# Patient Record
Sex: FEMALE | Race: BLACK OR AFRICAN AMERICAN | NOT HISPANIC OR LATINO | URBAN - METROPOLITAN AREA
[De-identification: names, ages, dates, MRNs, and addresses within clinical notes are randomized per-mention and may not be internally consistent; named-entity substitution may affect disease eponyms.]

---

## 2022-10-14 ENCOUNTER — NEW PATIENT (OUTPATIENT)
Dept: URBAN - METROPOLITAN AREA CLINIC 113 | Facility: CLINIC | Age: 72
End: 2022-10-14

## 2022-10-14 DIAGNOSIS — H43.812: ICD-10-CM

## 2022-10-14 DIAGNOSIS — H53.002: ICD-10-CM

## 2022-10-14 DIAGNOSIS — H25.12: ICD-10-CM

## 2022-10-14 DIAGNOSIS — Z96.1: ICD-10-CM

## 2022-10-14 PROCEDURE — 92202 OPSCPY EXTND ON/MAC DRAW: CPT | Mod: NC

## 2022-10-14 PROCEDURE — 92134 CPTRZ OPH DX IMG PST SGM RTA: CPT

## 2022-10-14 PROCEDURE — 92004 COMPRE OPH EXAM NEW PT 1/>: CPT

## 2022-10-14 ASSESSMENT — VISUAL ACUITY
OS_PH: 20/100+2
OS_SC: 20/150
OD_SC: 20/150+1

## 2022-10-14 ASSESSMENT — TONOMETRY
OS_IOP_MMHG: 19
OD_IOP_MMHG: 12

## 2024-07-11 ENCOUNTER — OFFICE VISIT (OUTPATIENT)
Dept: FAMILY MEDICINE | Facility: CLINIC | Age: 74
End: 2024-07-11
Payer: MEDICARE

## 2024-07-11 ENCOUNTER — NURSE TRIAGE (OUTPATIENT)
Dept: FAMILY MEDICINE | Facility: CLINIC | Age: 74
End: 2024-07-11
Payer: MEDICARE

## 2024-07-11 ENCOUNTER — HOSPITAL ENCOUNTER (EMERGENCY)
Facility: HOSPITAL | Age: 74
Discharge: HOME | End: 2024-07-11
Attending: EMERGENCY MEDICINE | Admitting: EMERGENCY MEDICINE
Payer: MEDICARE

## 2024-07-11 ENCOUNTER — APPOINTMENT (OUTPATIENT)
Dept: CT IMAGING | Facility: HOSPITAL | Age: 74
End: 2024-07-11
Attending: STUDENT IN AN ORGANIZED HEALTH CARE EDUCATION/TRAINING PROGRAM
Payer: MEDICARE

## 2024-07-11 VITALS
HEART RATE: 68 BPM | DIASTOLIC BLOOD PRESSURE: 79 MMHG | TEMPERATURE: 98 F | SYSTOLIC BLOOD PRESSURE: 151 MMHG | RESPIRATION RATE: 18 BRPM | OXYGEN SATURATION: 98 %

## 2024-07-11 VITALS
OXYGEN SATURATION: 100 % | BODY MASS INDEX: 30.37 KG/M2 | TEMPERATURE: 98 F | WEIGHT: 189 LBS | DIASTOLIC BLOOD PRESSURE: 81 MMHG | RESPIRATION RATE: 20 BRPM | SYSTOLIC BLOOD PRESSURE: 192 MMHG | HEART RATE: 67 BPM | HEIGHT: 66 IN

## 2024-07-11 DIAGNOSIS — R42 DIZZINESS: ICD-10-CM

## 2024-07-11 DIAGNOSIS — R07.9 ACUTE CHEST PAIN: Primary | ICD-10-CM

## 2024-07-11 DIAGNOSIS — R07.9 CHEST PAIN, UNSPECIFIED TYPE: ICD-10-CM

## 2024-07-11 DIAGNOSIS — Z75.8 DOES NOT HAVE PRIMARY CARE PROVIDER: ICD-10-CM

## 2024-07-11 PROBLEM — E78.5 HYPERLIPIDEMIA: Chronic | Status: ACTIVE | Noted: 2017-03-28

## 2024-07-11 PROBLEM — E55.9 VITAMIN D DEFICIENCY: Status: ACTIVE | Noted: 2017-03-28

## 2024-07-11 LAB
ALBUMIN SERPL BCG-MCNC: 4.4 G/DL (ref 3.5–5.2)
ALBUMIN UR-MCNC: NEGATIVE MG/DL
ALP SERPL-CCNC: 99 U/L (ref 40–150)
ALT SERPL W P-5'-P-CCNC: 12 U/L (ref 0–50)
ANION GAP SERPL CALCULATED.3IONS-SCNC: 12 MMOL/L (ref 7–15)
APPEARANCE UR: CLEAR
AST SERPL W P-5'-P-CCNC: 17 U/L (ref 0–45)
ATRIAL RATE - MUSE: 66 BPM
BACTERIA #/AREA URNS HPF: ABNORMAL /HPF
BASOPHILS # BLD AUTO: 0 10E3/UL (ref 0–0.2)
BASOPHILS NFR BLD AUTO: 0 %
BILIRUB SERPL-MCNC: 0.2 MG/DL
BILIRUB UR QL STRIP: NEGATIVE
BUN SERPL-MCNC: 13.1 MG/DL (ref 8–23)
CALCIUM SERPL-MCNC: 9.6 MG/DL (ref 8.8–10.2)
CHLORIDE SERPL-SCNC: 106 MMOL/L (ref 98–107)
COLOR UR AUTO: COLORLESS
CREAT SERPL-MCNC: 0.92 MG/DL (ref 0.51–0.95)
D DIMER PPP FEU-MCNC: 1.2 UG/ML FEU (ref 0–0.5)
DEPRECATED HCO3 PLAS-SCNC: 24 MMOL/L (ref 22–29)
DIASTOLIC BLOOD PRESSURE - MUSE: NORMAL MMHG
EGFRCR SERPLBLD CKD-EPI 2021: 66 ML/MIN/1.73M2
EOSINOPHIL # BLD AUTO: 0.1 10E3/UL (ref 0–0.7)
EOSINOPHIL NFR BLD AUTO: 1 %
ERYTHROCYTE [DISTWIDTH] IN BLOOD BY AUTOMATED COUNT: 12.8 % (ref 10–15)
FLUAV RNA SPEC QL NAA+PROBE: NEGATIVE
FLUBV RNA RESP QL NAA+PROBE: NEGATIVE
GLUCOSE SERPL-MCNC: 94 MG/DL (ref 70–99)
GLUCOSE UR STRIP-MCNC: NEGATIVE MG/DL
HCT VFR BLD AUTO: 37.4 % (ref 35–47)
HGB BLD-MCNC: 11.6 G/DL (ref 11.7–15.7)
HGB UR QL STRIP: NEGATIVE
IMM GRANULOCYTES # BLD: 0 10E3/UL
IMM GRANULOCYTES NFR BLD: 0 %
INTERPRETATION ECG - MUSE: NORMAL
KETONES UR STRIP-MCNC: NEGATIVE MG/DL
LEUKOCYTE ESTERASE UR QL STRIP: ABNORMAL
LYMPHOCYTES # BLD AUTO: 1.9 10E3/UL (ref 0.8–5.3)
LYMPHOCYTES NFR BLD AUTO: 43 %
MAGNESIUM SERPL-MCNC: 2.1 MG/DL (ref 1.7–2.3)
MCH RBC QN AUTO: 28.4 PG (ref 26.5–33)
MCHC RBC AUTO-ENTMCNC: 31 G/DL (ref 31.5–36.5)
MCV RBC AUTO: 92 FL (ref 78–100)
MONOCYTES # BLD AUTO: 0.4 10E3/UL (ref 0–1.3)
MONOCYTES NFR BLD AUTO: 8 %
NEUTROPHILS # BLD AUTO: 2.1 10E3/UL (ref 1.6–8.3)
NEUTROPHILS NFR BLD AUTO: 47 %
NITRATE UR QL: NEGATIVE
NRBC # BLD AUTO: 0 10E3/UL
NRBC BLD AUTO-RTO: 0 /100
NT-PROBNP SERPL-MCNC: 74 PG/ML (ref 0–900)
P AXIS - MUSE: 70 DEGREES
PH UR STRIP: 5.5 [PH] (ref 5–7)
PLATELET # BLD AUTO: 223 10E3/UL (ref 150–450)
POTASSIUM SERPL-SCNC: 4.5 MMOL/L (ref 3.4–5.3)
PR INTERVAL - MUSE: 174 MS
PROT SERPL-MCNC: 7.4 G/DL (ref 6.4–8.3)
QRS DURATION - MUSE: 92 MS
QT - MUSE: 402 MS
QTC - MUSE: 421 MS
R AXIS - MUSE: -3 DEGREES
RBC # BLD AUTO: 4.08 10E6/UL (ref 3.8–5.2)
RBC URINE: 1 /HPF
RSV RNA SPEC NAA+PROBE: NEGATIVE
SARS-COV-2 RNA RESP QL NAA+PROBE: NEGATIVE
SODIUM SERPL-SCNC: 142 MMOL/L (ref 135–145)
SP GR UR STRIP: 1.01 (ref 1–1.03)
SQUAMOUS EPITHELIAL: <1 /HPF
SYSTOLIC BLOOD PRESSURE - MUSE: NORMAL MMHG
T AXIS - MUSE: 29 DEGREES
TROPONIN T SERPL HS-MCNC: 6 NG/L
TSH SERPL DL<=0.005 MIU/L-ACNC: 1.04 UIU/ML (ref 0.3–4.2)
UROBILINOGEN UR STRIP-MCNC: <2 MG/DL
VENTRICULAR RATE- MUSE: 66 BPM
WBC # BLD AUTO: 4.5 10E3/UL (ref 4–11)
WBC URINE: 1 /HPF

## 2024-07-11 PROCEDURE — 81001 URINALYSIS AUTO W/SCOPE: CPT | Performed by: STUDENT IN AN ORGANIZED HEALTH CARE EDUCATION/TRAINING PROGRAM

## 2024-07-11 PROCEDURE — 80053 COMPREHEN METABOLIC PANEL: CPT | Performed by: STUDENT IN AN ORGANIZED HEALTH CARE EDUCATION/TRAINING PROGRAM

## 2024-07-11 PROCEDURE — 85004 AUTOMATED DIFF WBC COUNT: CPT | Performed by: STUDENT IN AN ORGANIZED HEALTH CARE EDUCATION/TRAINING PROGRAM

## 2024-07-11 PROCEDURE — 99285 EMERGENCY DEPT VISIT HI MDM: CPT | Mod: 25

## 2024-07-11 PROCEDURE — 85379 FIBRIN DEGRADATION QUANT: CPT | Performed by: STUDENT IN AN ORGANIZED HEALTH CARE EDUCATION/TRAINING PROGRAM

## 2024-07-11 PROCEDURE — 84443 ASSAY THYROID STIM HORMONE: CPT | Performed by: STUDENT IN AN ORGANIZED HEALTH CARE EDUCATION/TRAINING PROGRAM

## 2024-07-11 PROCEDURE — 87637 SARSCOV2&INF A&B&RSV AMP PRB: CPT | Performed by: STUDENT IN AN ORGANIZED HEALTH CARE EDUCATION/TRAINING PROGRAM

## 2024-07-11 PROCEDURE — 36415 COLL VENOUS BLD VENIPUNCTURE: CPT | Performed by: STUDENT IN AN ORGANIZED HEALTH CARE EDUCATION/TRAINING PROGRAM

## 2024-07-11 PROCEDURE — 83735 ASSAY OF MAGNESIUM: CPT | Performed by: STUDENT IN AN ORGANIZED HEALTH CARE EDUCATION/TRAINING PROGRAM

## 2024-07-11 PROCEDURE — 93000 ELECTROCARDIOGRAM COMPLETE: CPT | Mod: TC | Performed by: NURSE PRACTITIONER

## 2024-07-11 PROCEDURE — 71275 CT ANGIOGRAPHY CHEST: CPT | Mod: ME

## 2024-07-11 PROCEDURE — 96360 HYDRATION IV INFUSION INIT: CPT | Mod: 59

## 2024-07-11 PROCEDURE — 93005 ELECTROCARDIOGRAM TRACING: CPT | Performed by: STUDENT IN AN ORGANIZED HEALTH CARE EDUCATION/TRAINING PROGRAM

## 2024-07-11 PROCEDURE — 96361 HYDRATE IV INFUSION ADD-ON: CPT

## 2024-07-11 PROCEDURE — 250N000011 HC RX IP 250 OP 636: Performed by: EMERGENCY MEDICINE

## 2024-07-11 PROCEDURE — 93005 ELECTROCARDIOGRAM TRACING: CPT | Performed by: EMERGENCY MEDICINE

## 2024-07-11 PROCEDURE — 83880 ASSAY OF NATRIURETIC PEPTIDE: CPT | Performed by: STUDENT IN AN ORGANIZED HEALTH CARE EDUCATION/TRAINING PROGRAM

## 2024-07-11 PROCEDURE — 99204 OFFICE O/P NEW MOD 45 MIN: CPT | Performed by: NURSE PRACTITIONER

## 2024-07-11 PROCEDURE — 258N000003 HC RX IP 258 OP 636: Performed by: STUDENT IN AN ORGANIZED HEALTH CARE EDUCATION/TRAINING PROGRAM

## 2024-07-11 PROCEDURE — 93010 ELECTROCARDIOGRAM REPORT: CPT | Mod: OFF | Performed by: INTERNAL MEDICINE

## 2024-07-11 PROCEDURE — 84484 ASSAY OF TROPONIN QUANT: CPT | Performed by: STUDENT IN AN ORGANIZED HEALTH CARE EDUCATION/TRAINING PROGRAM

## 2024-07-11 RX ORDER — CETIRIZINE HYDROCHLORIDE 10 MG/1
1 TABLET ORAL
COMMUNITY
Start: 2023-09-19

## 2024-07-11 RX ORDER — SIMVASTATIN 20 MG
20 TABLET ORAL EVERY MORNING
COMMUNITY
Start: 2024-06-03

## 2024-07-11 RX ORDER — ALBUTEROL SULFATE 90 UG/1
AEROSOL, METERED RESPIRATORY (INHALATION)
COMMUNITY
Start: 2024-06-01

## 2024-07-11 RX ORDER — ASPIRIN 325 MG
325 TABLET ORAL ONCE
Status: COMPLETED | OUTPATIENT
Start: 2024-07-11 | End: 2024-07-11

## 2024-07-11 RX ORDER — IOPAMIDOL 755 MG/ML
90 INJECTION, SOLUTION INTRAVASCULAR ONCE
Status: COMPLETED | OUTPATIENT
Start: 2024-07-11 | End: 2024-07-11

## 2024-07-11 RX ORDER — ALBUTEROL SULFATE 0.83 MG/ML
SOLUTION RESPIRATORY (INHALATION)
COMMUNITY
Start: 2024-04-16

## 2024-07-11 RX ORDER — PREDNISONE 20 MG/1
1 TABLET ORAL
COMMUNITY
Start: 2024-05-31

## 2024-07-11 RX ADMIN — IOPAMIDOL 90 ML: 755 INJECTION, SOLUTION INTRAVENOUS at 18:20

## 2024-07-11 RX ADMIN — SODIUM CHLORIDE 1000 ML: 9 INJECTION, SOLUTION INTRAVENOUS at 16:35

## 2024-07-11 RX ADMIN — Medication 325 MG: at 15:20

## 2024-07-11 ASSESSMENT — ENCOUNTER SYMPTOMS
COUGH: 0
DIZZINESS: 1
FEVER: 0

## 2024-07-11 ASSESSMENT — COLUMBIA-SUICIDE SEVERITY RATING SCALE - C-SSRS
2. HAVE YOU ACTUALLY HAD ANY THOUGHTS OF KILLING YOURSELF IN THE PAST MONTH?: NO
1. IN THE PAST MONTH, HAVE YOU WISHED YOU WERE DEAD OR WISHED YOU COULD GO TO SLEEP AND NOT WAKE UP?: NO
6. HAVE YOU EVER DONE ANYTHING, STARTED TO DO ANYTHING, OR PREPARED TO DO ANYTHING TO END YOUR LIFE?: NO

## 2024-07-11 ASSESSMENT — ACTIVITIES OF DAILY LIVING (ADL)
ADLS_ACUITY_SCORE: 35
ADLS_ACUITY_SCORE: 35

## 2024-07-11 NOTE — PROGRESS NOTES
Assessment & Plan     Acute chest pain    - EKG 12-lead complete w/read - Clinics  - aspirin (ASA) tablet 325 mg  - EKG 12-lead, tracing only     Patient with a history of hypertension and hyperlipidemia with achy chest pain starting yesterday.  Midsternal.  Currently rated 5 out of 10.  Associated little bit of dizziness.    EKG done here showed normal sinus without concerning changes.  Did give 325 mg aspirin.    Explained need for emergency room evaluation.  Here with daughter.  Daughter will take her to [facility] ED.           No follow-ups on file.    Cyndi Doty, [facility] FLORENTINO Gonzales is a 73 year old female who presents to clinic today for the following health issues:  Chief Complaint   Patient presents with    Chest Pain     Chest pain/headache/dizzy/weakness since yesterday          HPI    Patient with history of asthma, hypertension, hyperlipidemia with midsternal chest pain starting yesterday.  Says it is better than it was earlier.  Rates current pain 5 out of 10.  Feels achy.  Does not feel wheezy or like her asthma is acting up at this time.    Associate with a little bit of dizziness.  Does not think it is much worse with walking.        Review of Systems   Constitutional:  Negative for fever.   Respiratory:  Negative for cough.    Neurological:  Positive for dizziness (A little bit).           Objective    BP (!) 151/79   Pulse 68   Temp 98  F (36.7  C) (Tympanic)   Resp 18   SpO2 98%   Physical Exam  Constitutional:       Appearance: Normal appearance.   HENT:      Mouth/Throat:      Mouth: Mucous membranes are moist.   Pulmonary:      Effort: Pulmonary effort is normal.   Musculoskeletal:         General: Normal range of motion.   Skin:     General: Skin is warm.   Neurological:      Mental Status: She is alert and oriented to person, place, and time.   Psychiatric:         Mood and Affect: Mood normal.                Results for orders placed or  performed in visit on 07/11/24   EKG 12-lead, tracing only     Status: None (Preliminary result)   Result Value Ref Range    Systolic Blood Pressure  mmHg    Diastolic Blood Pressure  mmHg    Ventricular Rate 66 BPM    Atrial Rate 66 BPM    ME Interval 174 ms    QRS Duration 92 ms     ms    QTc 421 ms    P Axis 70 degrees    R AXIS -3 degrees    T Axis 29 degrees    Interpretation ECG       Sinus rhythm  Normal ECG  No previous ECGs available

## 2024-07-11 NOTE — ED TRIAGE NOTES
Pt reports CP and fatigue and fevers since yesterday, pt was seen at  and was told to come to the ED.  Pt reports feeling feverish, but is afebrile in triage, pt has taken APAP PTA.     Triage Assessment (Adult)       Row Name 07/11/24 1540          Triage Assessment    Airway WDL WDL        Respiratory WDL    Respiratory WDL WDL        Skin Circulation/Temperature WDL    Skin Circulation/Temperature WDL WDL        Cardiac WDL    Cardiac WDL X;chest pain        Chest Pain Assessment    Chest Pain Location midsternal     Chest Pain Radiation arm     Character pressure     Alleviating Factors medications        Peripheral/Neurovascular WDL    Peripheral Neurovascular WDL WDL        Cognitive/Neuro/Behavioral WDL    Cognitive/Neuro/Behavioral WDL WDL

## 2024-07-11 NOTE — ED PROVIDER NOTES
Emergency Department Encounter   NAME: Janet Brar ; AGE: 73 year old female ; YOB: 1950 ; MRN: [id number] ; PCP: No Ref-Primary, Physician   ED PROVIDER: Irma Horta PA-C    Evaluation Date & Time:   No admission date for patient encounter.    CHIEF COMPLAINT:  Chest Pain, Fever, and Fatigue (/)        Impression and Plan   FINAL IMPRESSION:    ICD-10-CM    1. Chest pain, unspecified type  R07.9 Primary Care Referral     [facility]  Referral      2. Dizziness  R42 Primary Care Referral     [facility]  Referral      3. Does not have primary care provider  Z75.8 Primary Care Referral          MDM:  Patient is a 73 year old female presenting to the ED for evaluation of chest pain, dizziness, and fatigue that started last night around 7:30 PM.  She states she was sitting in a chair listening to audio on her phone when she developed sudden tightness in the center of her chest and then experienced room spinning dizziness. States the chest pain lasted about 45 minutes and then subsided completely.  She denies any associated headache, vision changes, nausea, abdominal pain, shortness of breath, slurred speech, or confusion.  She endorses having 2 episodes similar to this last year but she went to bed and when she woke up in the morning her symptoms had subsided entirely.  Today she woke up and still had some dizziness so she went to urgent care. They performed EKG, gave her 325 ASA, and sent her to the ED for further evaluation. She rates her dizziness last night at an 8/10, today down to a 5/10. She still denies any vision changes, headache, slurred speech, weakness, or gait ataxia.  She states that she does not drink very much water does feel as though she is quite symptomatic with dehydration.  Also endorses occasional feelings of fever and chills.  Denies any known sick contacts and does not endorse cough, congestion, or sore throat.  Denies any falls or trauma  to the head.  She is not on any blood thinners.    Vitals reviewed and unremarkable.  She is afebrile temp 98 F.  On exam she is resting comfortably, nontoxic-appearing. Differential diagnosis includes but not limited to dehydration, anemia, electrolyte abnormality, viral infection, GERD, gastritis, PE, pneumonia, ACS, stroke, intracranial hemorrhage. Her COVID and influenza swabs are negative today.  CBC is negative for any evidence of leukocytosis or significant anemia.  Electrolytes within normal range.  EKG shows sinus rhythm with no evidence of prolonged QT, arrhythmia, or ischemia.  Her high-sensitivity troponin is 6 and not significant for ACS. This was not repeated given her symptoms started last night. Her neurologic exam today is without deficit.  Walking gait is nonantalgic and she does not have any vision changes.  I do not suspect stroke or other malicious pathology that would require imaging of the brain. I am unable to use PERC criteria to rule out pulmonary embolism given patient's age and she was describing pleuritic chest pain last night so D-dimer was ordered. This is slightly elevated at 1.2 so proceeded with CT PE.  This was negative for any evidence of PE, pneumonia, or other acute finding. There is evidence of coronary artery disease.    Patient endorses complete resolution of her dizziness after administration of 1 L of IV fluids.  She states she has been getting up to go to the bathroom throughout her time in the ED and has not been dizzy at home with ambulation. She does not have any chest pain currently and her cardiac workup is overall unremarkable, I think at this point she is safe for discharge home with plan for her to follow-up with PCP and cardiology for possible further testing. She recently relocated to the area and does not have a primary care established so I have placed a referral to primary care for her.  I have also placed a cardiology referral for her.  I recommended patient  try to stay hydrated and drink plenty of water and get plenty of rest. Patient and her sister were educated on concerning symptoms of warrant return to the emergency department and are understanding and agreeable to this plan.        History:  Supplemental history from: N/A  External Record(s) reviewed: Documented in chart and Outpatient Record: [facility]    Work Up:  Chart documentation includes differential considered and any EKGs or imaging independently interpreted by provider, where specified.  In additional to work up documented, I considered the following work up: Documented in chart, if applicable.    External consultation:  Discussion of management with another provider: Documented in chart, if applicable    Complicating factors:  Care impacted by chronic illness: N/A  Care affected by social determinants of health: N/A    Disposition considerations: Discharge. No recommendations on prescription strength medication(s). See documentation for any additional details.      ED COURSE:  4:04 PM I met and introduced myself to the patient. I gathered initial history and performed my physical exam. We discussed plan for initial workup.   4:38 PM I have staffed the patient with Dr. Munir Mcghee, ED MD, who has evaluated the patient and agrees with all aspects of today's care.   7:15 PM I rechecked the patient and discussed results, discharge, follow up, and reasons to return to the ED.     At the conclusion of the encounter I discussed the results of all the tests and the disposition. The questions were answered. The patient or family acknowledged understanding and was agreeable with the care plan.          MEDICATIONS GIVEN IN THE EMERGENCY DEPARTMENT:  Medications   sodium chloride 0.9% BOLUS 1,000 mL (0 mLs Intravenous Stopped 7/11/24 1901)   iopamidol (ISOVUE-370) solution 90 mL (90 mLs Intravenous $Given 7/11/24 1820)         NEW PRESCRIPTIONS STARTED AT TODAY'S ED VISIT:  New  Prescriptions    No medications on file         HPI   Patient information was obtained from: patient    Use of Intrepreter: N/A     Janet Brar is a 73 year old female with a pertinent history of, hypertension, hyperlipidemia who presents to the ED by private vehicle for evaluation of chest pain, dizziness and weakness.     Per chart review patient was seen at [facility] on 7/11/24 for chest pain, dizziness and weakness since 7/10/24. EKG was unremarkable. Given 325mg of Asprin and recommended to visit ED for her chest pain.     Per patient she starting yesterday (7/10/24) at 7:30 PM she had chest pain that felt like a weight on her chest, dizziness (8/10), weakness, nausea, and a headache onset. She proceeded to lie down for two hours then went to bed with no problems. This morning (7/11/24) patient woke up still feeling dizzy (5/10) and weak, but her chest pain, nausea, and headache subsided completely. She took some tylenol then went to urgent care, where they took her temperature and gave her Asprin. Upon evaluation patient still feels dizzy and the chest pain is not present. Patient endorses she has episodes like this twice in the last year without any evaluation because she would wake up feeling better in the morning. Note she hasn't taken her inhaler in the last two days and has not been drinking very much water.     Patient denies cough, congestion, shortness of breath, and pain while breathing.  Patient denies history of heart problems, smoking and blood clots.         Medical History     History reviewed. No pertinent past medical history.    History reviewed. No pertinent surgical history.    No family history on file.         albuterol (PROAIR HFA/PROVENTIL HFA/VENTOLIN HFA) 108 (90 Base) MCG/ACT inhaler  albuterol (PROVENTIL) (2.5 MG/3ML) 0.083% neb solution  cetirizine (ZYRTEC) 10 MG tablet  predniSONE (DELTASONE) 20 MG tablet  simvastatin (ZOCOR) 20 MG  "tablet          Physical Exam     First Vitals:  Patient Vitals for the past 24 hrs:   BP Temp Temp src Pulse Resp SpO2 Height Weight   07/11/24 1900 (!) 192/81 -- -- -- -- -- -- --   07/11/24 1800 (!) 195/82 -- -- 67 -- 100 % -- --   07/11/24 1754 -- -- -- -- -- -- 1.676 m (5' 6\") 85.7 kg (189 lb)   07/11/24 1607 105/68 98  F (36.7  C) Temporal 67 20 96 % -- --         PHYSICAL EXAM    General Appearance:  Alert, cooperative, no distress, appears stated age. Resting comfortably, non-toxic appearing.  HENT: Normocephalic without obvious deformity, atraumatic. Mucous membranes moist   Eyes: Conjunctiva clear, Lids normal. No discharge.   Respiratory: No distress. Lungs clear to ausculation bilaterally. No wheezes, rhonchi or stridor  Cardiovascular: Regular rate and rhythm, no murmur. Normal cap refill. No peripheral edema  GI: Abdomen soft, nontender  Musculoskeletal: Moving all extremities. No gross deformities  Integument: Warm, dry, no rashes or lesions  Neurologic: Alert and orientated x3. No focal deficits. GCS 15. Cranial nerves III through XII intact.  No facial asymmetry.  Sensation to light touch intact to face and all extremities.  Finger/nose/finger intact.  Negative pronator drift.  Intact straight leg raise.  5 out of 5 strength with , flexion extension at elbow joints, flexion at shoulder and hip joint against resistance.  Dorsiflexion and plantarflexion are intact.  Answering questions appropriately with normal speech.  No aphasia or dysarthria.  Following commands.  Intact visual fields.    Psych: Normal mood and affect        Results     LAB:  All pertinent labs reviewed and interpreted  Labs Ordered and Resulted from Time of ED Arrival to Time of ED Departure   ROUTINE UA WITH MICROSCOPIC REFLEX TO CULTURE - Abnormal       Result Value    Color Urine Colorless      Appearance Urine Clear      Glucose Urine Negative      Bilirubin Urine Negative      Ketones Urine Negative      Specific Gravity " Urine 1.006      Blood Urine Negative      pH Urine 5.5      Protein Albumin Urine Negative      Urobilinogen Urine <2.0      Nitrite Urine Negative      Leukocyte Esterase Urine 75 Eleni/uL (*)     Bacteria Urine Few (*)     RBC Urine 1      WBC Urine 1      Squamous Epithelials Urine <1     D DIMER QUANTITATIVE - Abnormal    D-Dimer Quantitative 1.20 (*)    CBC WITH PLATELETS AND DIFFERENTIAL - Abnormal    WBC Count 4.5      RBC Count 4.08      Hemoglobin 11.6 (*)     Hematocrit 37.4      MCV 92      MCH 28.4      MCHC 31.0 (*)     RDW 12.8      Platelet Count 223      % Neutrophils 47      % Lymphocytes 43      % Monocytes 8      % Eosinophils 1      % Basophils 0      % Immature Granulocytes 0      NRBCs per 100 WBC 0      Absolute Neutrophils 2.1      Absolute Lymphocytes 1.9      Absolute Monocytes 0.4      Absolute Eosinophils 0.1      Absolute Basophils 0.0      Absolute Immature Granulocytes 0.0      Absolute NRBCs 0.0     INFLUENZA A/B, RSV, & SARS-COV2 PCR - Normal    Influenza A PCR Negative      Influenza B PCR Negative      RSV PCR Negative      SARS CoV2 PCR Negative     TROPONIN T, HIGH SENSITIVITY - Normal    Troponin T, High Sensitivity 6     NT PROBNP INPATIENT - Normal    N terminal Pro BNP Inpatient 74     MAGNESIUM - Normal    Magnesium 2.1     TSH WITH FREE T4 REFLEX - Normal    TSH 1.04     COMPREHENSIVE METABOLIC PANEL - Normal    Sodium 142      Potassium 4.5      Carbon Dioxide (CO2) 24      Anion Gap 12      Urea Nitrogen 13.1      Creatinine 0.92      GFR Estimate 66      Calcium 9.6      Chloride 106      Glucose 94      Alkaline Phosphatase 99      AST 17      ALT 12      Protein Total 7.4      Albumin 4.4      Bilirubin Total 0.2         RADIOLOGY:  CT Chest Pulmonary Embolism w Contrast   Final Result   IMPRESSION:   1.  No PE.   2.  Coronary artery disease.   3.  A circumscribed low-attenuation right hepatic lesion is incompletely assessed. In the absence of hepatic parenchymal  disease a benign lesion is most likely. This could be assessed with MRI or CT. Ultrasound may be helpful.               ECG:  Performed at: 16:11    Impression: Sinus rhythm    Rate: 66  Rhythm: sinus  Axis: normal  DE Interval: 174  QRS Interval: 92  QTc Interval: 421  ST Changes: none  Comparison: no previous available    EKG results reviewed and interpreted by Dr. Litzy ED MD.       PROCEDURES:  N/A      I, Angus Nash , am serving as a scribe to document services personally performed by Irma Horta PA-C, based on my observation and the provider's statements to me. I, Irma Horta PA-C attest that Angus Nash  is acting in a scribe capacity, has observed my performance of the services and has documented them in accordance with my direction.       Irma Horta PA-C   Emergency Medicine   [facility] EMERGENCY DEPARTMENT       Irma Horta PA-C  07/11/24 1941       Irma Horta PA-C  07/11/24 1944

## 2024-07-11 NOTE — TELEPHONE ENCOUNTER
"Nurse Triage SBAR    Is this a 2nd Level Triage? NO    Situation: Dizziness     Background: Patient had chest pain last night which resolved and now has dizziness that won't go away    Assessment: Patient called in with complaints of dizziness rated from mild to moderate. She does not endorse chest pain that she had last night, but this dizziness hasn't gone away. She reports having it twice last year which resolved with simply sleeping, but that has not been the case for her this time. She reports having chills and being somewhat dehydrated as water \"isn't her thing\". She reports taking BP medications, though she does not check them at home at all. She was instructed to be seen due to the ambiguity of the patient's report as at one moment she would say she only has asthma, but as the conversation proceeded she then talked about other issues such as HTN, generalized weakness, etc. She is being taken in to be evaluated at [place].     Protocol Recommended Disposition:   No disposition on file.    Recommendation: Be seen in office today, no appointments available so sent to urgent care      Does the patient meet one of the following criteria for ADS visit consideration? 16+ years old, with an [facility] PCP     TIP  Providers, please consider if this condition is appropriate for management at one of our Acute and Diagnostic Services sites.     If patient is a good candidate, please use dotphrase <dot>triageresponse and select Refer to ADS to document.  Reason for Disposition   MODERATE dizziness (e.g., interferes with normal activities)  (Exception: Dizziness caused by heat exposure, sudden standing, or poor fluid intake.)    Additional Information   Negative: SEVERE difficulty breathing (e.g., struggling for each breath, speaks in single words)   Negative: Shock suspected (e.g., cold/pale/clammy skin, too weak to stand, low BP, rapid pulse)   Negative: Difficult to awaken or acting confused (e.g., disoriented, slurred " speech)   Negative: Fainted, and still feels dizzy afterwards   Negative: Overdose (accidental or intentional) of medications   Negative: New neurologic deficit that is present now: * Weakness of the face, arm, or leg on one side of the body * Numbness of the face, arm, or leg on one side of the body * Loss of speech or garbled speech   Negative: Heart beating < 50 beats per minute OR > 140 beats per minute   Negative: Sounds like a life-threatening emergency to the triager   Negative: Chest pain   Negative: Rectal bleeding, bloody stool, or tarry-black stool   Negative: Vomiting is main symptom   Negative: Diarrhea is main symptom   Negative: Headache is main symptom   Negative: Heat exhaustion suspected (i.e., dehydration from heat exposure)   Negative: Patient states that they are having an anxiety or panic attack   Negative: Dizziness from low blood sugar (i.e., < 60 mg/dl or 3.5 mmol/l)   Negative: SEVERE dizziness (e.g., unable to stand, requires support to walk, feels like passing out now)   Negative: SEVERE headache or neck pain   Negative: Spinning or tilting sensation (vertigo) present now and one or more stroke risk factors (i.e., hypertension, diabetes mellitus, prior stroke/TIA, heart attack, age over 60) (Exception: Prior physician evaluation for this AND no different/worse than usual.)   Negative: Neurologic deficit that was brief (now gone), ANY of the following:* Weakness of the face, arm, or leg on one side of the body* Numbness of the face, arm, or leg on one side of the body* Loss of speech or garbled speech   Negative: Loss of vision or double vision  (Exception: Similar to previous migraines.)   Negative: Extra heartbeats, irregular heart beating, or heart is beating very fast (i.e., 'palpitations')   Negative: Difficulty breathing   Negative: Drinking very little and dehydration suspected (e.g., no urine > 12 hours, very dry mouth, very lightheaded)   Negative: Follows bleeding (e.g., stomach,  "rectum, vagina)  (Exception: Became dizzy from sight of small amount blood.)   Negative: Patient sounds very sick or weak to the triager   Negative: Lightheadedness (dizziness) present now, after 2 hours of rest and fluids   Negative: Spinning or tilting sensation (vertigo) present now   Negative: Fever > 103 F (39.4 C)   Negative: Fever > 100.0 F (37.8 C) and has diabetes mellitus or a weak immune system (e.g., HIV positive, cancer chemotherapy, organ transplant, splenectomy, chronic steroids)    Answer Assessment - Initial Assessment Questions  1. DESCRIPTION: \"Describe your dizziness.\"      Feels like it's hard to focus her eyes  2. LIGHTHEADED: \"Do you feel lightheaded?\" (e.g., somewhat faint, woozy, weak upon standing)      Weak upon standing and somewhat weak  3. VERTIGO: \"Do you feel like either you or the room is spinning or tilting?\" (i.e. vertigo)      No  4. SEVERITY: \"How bad is it?\"  \"Do you feel like you are going to faint?\" \"Can you stand and walk?\"    - MILD: Feels slightly dizzy, but walking normally.    - MODERATE: Feels unsteady when walking, but not falling; interferes with normal activities (e.g., school, work).    - SEVERE: Unable to walk without falling, or requires assistance to walk without falling; feels like passing out now.       moderate  5. ONSET:  \"When did the dizziness begin?\"      Yesterday night  6. AGGRAVATING FACTORS: \"Does anything make it worse?\" (e.g., standing, change in head position)      When standing  7. HEART RATE: \"Can you tell me your heart rate?\" \"How many beats in 15 seconds?\"  (Note: not all patients can do this)        N/A  8. CAUSE: \"What do you think is causing the dizziness?\"      Doesn't know  9. RECURRENT SYMPTOM: \"Have you had dizziness before?\" If Yes, ask: \"When was the last time?\" \"What happened that time?\"      Yes it's happened before, she just slept and it felt better  10. OTHER SYMPTOMS: \"Do you have any other symptoms?\" (e.g., fever, chest pain, " "vomiting, diarrhea, bleeding)        Chills  11. PREGNANCY: \"Is there any chance you are pregnant?\" \"When was your last menstrual period?\"        No    Protocols used: Dizziness-A-OH    Eliseo Bishop RN  [facility]  "

## 2024-07-11 NOTE — ED PROVIDER NOTES
"Emergency Department Midlevel Supervisory Note     I had a face to face encounter with this patient seen by the Advanced Practice Provider (FABIANA). I personally made/approved the management plan and take responsibility for the patient management. I personally saw patient and performed a substantive portion of the visit including all aspects of the medical decision making.     ED Course:  4:25 PM Irma Horta PA-C staffed patient with me. I agree with their assessment and plan of management, and I will see the patient.  6:00 PM I met with the patient to introduce myself, gather additional history, perform my initial exam, and discuss the plan.     Brief HPI:     Janet Brar is a 73 year old female who presents for evaluation of chest pain, fever, and fatigue. She was seen at urgent care but was told to present to the emergency department. She was afebrile in triage.     I, Shahid Mobley, am serving as a scribe to document services personally performed by Munir Mcghee MD, based on my observations and the provider's statements to me.   I, Munir Mcghee MD attest that Shahid Mobley was acting in a scribe capacity, has observed my performance of the services and has documented them in accordance with my direction.    Brief Physical Exam: BP (!) 192/81   Pulse 67   Temp 98  F (36.7  C) (Temporal)   Resp 20   Ht 1.676 m (5' 6\")   Wt 85.7 kg (189 lb)   SpO2 100%   BMI 30.51 kg/m    Constitutional:  Alert, in no acute distress  EYES: Conjunctivae clear  HENT:  Atraumatic  Respiratory:  Respirations even, unlabored, in no acute respiratory distress, CTAB  Cardiovascular:  Regular rate and rhythm, good peripheral perfusion  GI: Soft, non-distended, non-tender  Musculoskeletal:  Moves all 4 extremities equally, grossly symmetrical strength  Integument: Warm & dry. No appreciable rash, erythema.  Neurologic:  Alert & oriented, speech clear and fluent, no focal deficits noted  Psych: Normal mood and affect     "   MDM:  Patient is a 73-year-old female who presents the emergency department with chest pain.  She was referred from urgent care for continued evaluation.  EKG shows sinus rhythm without any acute ischemic changes.  High-sensitivity troponin less than 6 rules out acute coronary syndrome although patient does have some cardiovascular risk factors including hypertension and had some noted coronary artery disease on her CT scan.  D-dimer was elevated and followed by CT pulmonary angiogram negative for acute PE or other acute thoracic process to account for her discomfort.  Patient initially reporting some dizziness however this resolved with IV fluid and seemed like more of a secondary concern.  Her neurological exam was intact.  She was resting comfortably on reevaluation.  We discussed continuing her evaluation.  Will place referral to rapid access cardiology clinic as well as primary care to get established as she is moving to the area.  She currently feels well and is comfortable with continued outpatient follow-up.  Return precautions reviewed.  Agree with remainder of ED course and plan as documented by [facility].        1. Chest pain, unspecified type    2. Dizziness        Labs and Imaging:  Results for orders placed or performed during the hospital encounter of 07/11/24   CT Chest Pulmonary Embolism w Contrast    Impression    IMPRESSION:  1.  No PE.  2.  Coronary artery disease.  3.  A circumscribed low-attenuation right hepatic lesion is incompletely assessed. In the absence of hepatic parenchymal disease a benign lesion is most likely. This could be assessed with MRI or CT. Ultrasound may be helpful.     Symptomatic Influenza A/B, RSV, & SARS-CoV2 PCR (COVID-19) Nose    Specimen: Nose; Swab   Result Value Ref Range    Influenza A PCR Negative Negative    Influenza B PCR Negative Negative    RSV PCR Negative Negative    SARS CoV2 PCR Negative Negative   Troponin T, High Sensitivity (now)   Result Value Ref  Range    Troponin T, High Sensitivity 6 <=14 ng/L   Nt probnp inpatient   Result Value Ref Range    N terminal Pro BNP Inpatient 74 0 - 900 pg/mL   Result Value Ref Range    Magnesium 2.1 1.7 - 2.3 mg/dL   TSH with free T4 reflex   Result Value Ref Range    TSH 1.04 0.30 - 4.20 uIU/mL   Comprehensive metabolic panel   Result Value Ref Range    Sodium 142 135 - 145 mmol/L    Potassium 4.5 3.4 - 5.3 mmol/L    Carbon Dioxide (CO2) 24 22 - 29 mmol/L    Anion Gap 12 7 - 15 mmol/L    Urea Nitrogen 13.1 8.0 - 23.0 mg/dL    Creatinine 0.92 0.51 - 0.95 mg/dL    GFR Estimate 66 >60 mL/min/1.73m2    Calcium 9.6 8.8 - 10.2 mg/dL    Chloride 106 98 - 107 mmol/L    Glucose 94 70 - 99 mg/dL    Alkaline Phosphatase 99 40 - 150 U/L    AST 17 0 - 45 U/L    ALT 12 0 - 50 U/L    Protein Total 7.4 6.4 - 8.3 g/dL    Albumin 4.4 3.5 - 5.2 g/dL    Bilirubin Total 0.2 <=1.2 mg/dL   UA with Microscopic reflex to Culture    Specimen: Urine, Clean Catch   Result Value Ref Range    Color Urine Colorless Colorless, Straw, Light Yellow, Yellow    Appearance Urine Clear Clear    Glucose Urine Negative Negative mg/dL    Bilirubin Urine Negative Negative    Ketones Urine Negative Negative mg/dL    Specific Gravity Urine 1.006 1.001 - 1.030    Blood Urine Negative Negative    pH Urine 5.5 5.0 - 7.0    Protein Albumin Urine Negative Negative mg/dL    Urobilinogen Urine <2.0 <2.0 mg/dL    Nitrite Urine Negative Negative    Leukocyte Esterase Urine 75 Eleni/uL (A) Negative    Bacteria Urine Few (A) None Seen /HPF    RBC Urine 1 <=2 /HPF    WBC Urine 1 <=5 /HPF    Squamous Epithelials Urine <1 <=1 /HPF   D dimer quantitative   Result Value Ref Range    D-Dimer Quantitative 1.20 (H) 0.00 - 0.50 ug/mL FEU   CBC with platelets and differential   Result Value Ref Range    WBC Count 4.5 4.0 - 11.0 10e3/uL    RBC Count 4.08 3.80 - 5.20 10e6/uL    Hemoglobin 11.6 (L) 11.7 - 15.7 g/dL    Hematocrit 37.4 35.0 - 47.0 %    MCV 92 78 - 100 fL    MCH 28.4 26.5 - 33.0  pg    MCHC 31.0 (L) 31.5 - 36.5 g/dL    RDW 12.8 10.0 - 15.0 %    Platelet Count 223 150 - 450 10e3/uL    % Neutrophils 47 %    % Lymphocytes 43 %    % Monocytes 8 %    % Eosinophils 1 %    % Basophils 0 %    % Immature Granulocytes 0 %    NRBCs per 100 WBC 0 <1 /100    Absolute Neutrophils 2.1 1.6 - 8.3 10e3/uL    Absolute Lymphocytes 1.9 0.8 - 5.3 10e3/uL    Absolute Monocytes 0.4 0.0 - 1.3 10e3/uL    Absolute Eosinophils 0.1 0.0 - 0.7 10e3/uL    Absolute Basophils 0.0 0.0 - 0.2 10e3/uL    Absolute Immature Granulocytes 0.0 <=0.4 10e3/uL    Absolute NRBCs 0.0 10e3/uL           Munir Mcghee MD  [facility] EMERGENCY DEPARTMENT  [address] [place] [id number] [phone]       Qasim Mcghee MD  07/11/24 3430

## 2024-07-11 NOTE — ED NOTES
Expected Patient Referral to ED  3:23 PM    Referring Clinic/Provider:  Walk in clinic    Reason for referral/Clinical facts:  Chest pain, aching. Hx of HTN, hyperlipidemia. Started yesterday. Dizzy associated with chest pressure. 325 asa given.   EKG normal.    Recommendations provided:  Send to ED for further evaluation    Caller was informed that this institution does possess the capabilities and/or resources to provide for patient and should be transferred to our facility.    Discussed that if direct admit is sought and any hurdles are encountered, this ED would be happy to see the patient and evaluate.    Informed caller that recommendations provided are recommendations based only on the facts provided and that they responsible to accept or reject the advice, or to seek a formal in person consultation as needed and that this ED will see/treat patient should they arrive.      SARA TRONCOSO MD  [facility] EMERGENCY DEPARTMENT  [address] [place] [id number]  [phone]       Sara Troncoso MD  07/11/24 1490

## 2024-07-12 NOTE — DISCHARGE INSTRUCTIONS
You were seen in the emergency department today for an episode of chest pain and dizziness.  Your lab work today is all overall unremarkable.  Your cardiac workup is reassuring.  Your urine does not show evidence of urinary tract infection.  You are given a liter of IV fluids here in the emergency department and experienced complete resolution of your dizziness.  I suspect that some of your symptoms could be due to dehydration and I recommend trying to intake plenty of fluids.    I have placed a referral to primary care for you, they will call you to schedule follow-up visit.  Have also placed a cardiology referral for you to undergo further cardiac testing given you have had 3 episodes of this chest pain and dizziness over the past year or so.    Return to the emergency department if you develop any severe chest pain, sudden worst headache of your life, shortness of breath, or pass out

## 2024-07-12 NOTE — ED NOTES
Patient's blood pressure is high. Patient did not take BP medication today. Last took yesterday morning.

## 2024-07-15 LAB
ATRIAL RATE - MUSE: 68 BPM
DIASTOLIC BLOOD PRESSURE - MUSE: NORMAL MMHG
INTERPRETATION ECG - MUSE: NORMAL
P AXIS - MUSE: 71 DEGREES
PR INTERVAL - MUSE: 182 MS
QRS DURATION - MUSE: 98 MS
QT - MUSE: 408 MS
QTC - MUSE: 433 MS
R AXIS - MUSE: 3 DEGREES
SYSTOLIC BLOOD PRESSURE - MUSE: NORMAL MMHG
T AXIS - MUSE: 38 DEGREES
VENTRICULAR RATE- MUSE: 68 BPM

## 2024-07-26 ENCOUNTER — OFFICE VISIT (OUTPATIENT)
Dept: CARDIOLOGY | Facility: CLINIC | Age: 74
End: 2024-07-26
Attending: STUDENT IN AN ORGANIZED HEALTH CARE EDUCATION/TRAINING PROGRAM
Payer: MEDICARE

## 2024-07-26 VITALS
DIASTOLIC BLOOD PRESSURE: 60 MMHG | BODY MASS INDEX: 26.68 KG/M2 | WEIGHT: 166 LBS | HEIGHT: 66 IN | HEART RATE: 86 BPM | SYSTOLIC BLOOD PRESSURE: 122 MMHG

## 2024-07-26 DIAGNOSIS — J45.20 MILD INTERMITTENT ASTHMA WITHOUT COMPLICATION: Chronic | ICD-10-CM

## 2024-07-26 DIAGNOSIS — I10 BENIGN ESSENTIAL HYPERTENSION: ICD-10-CM

## 2024-07-26 DIAGNOSIS — I25.9 CHEST PAIN DUE TO MYOCARDIAL ISCHEMIA, UNSPECIFIED ISCHEMIC CHEST PAIN TYPE: Primary | ICD-10-CM

## 2024-07-26 DIAGNOSIS — E78.5 DYSLIPIDEMIA: ICD-10-CM

## 2024-07-26 PROCEDURE — 99203 OFFICE O/P NEW LOW 30 MIN: CPT | Performed by: INTERNAL MEDICINE

## 2024-07-26 RX ORDER — FLUTICASONE PROPIONATE AND SALMETEROL 500; 50 UG/1; UG/1
1 POWDER RESPIRATORY (INHALATION) 2 TIMES DAILY
COMMUNITY
Start: 2024-06-25

## 2024-07-26 RX ORDER — FLUTICASONE PROPIONATE 50 MCG
SPRAY, SUSPENSION (ML) NASAL PRN
COMMUNITY
Start: 2024-04-02

## 2024-07-26 RX ORDER — AMLODIPINE BESYLATE 5 MG/1
1 TABLET ORAL
COMMUNITY
Start: 2024-05-31

## 2024-07-26 NOTE — PROGRESS NOTES
Click to link to [facility] HEART CARE NOTE    Thank you, Dr. Horta, for asking me to see Janet Brar in consultation at [facility] to evaluate chest pain.      Assessment/Plan:   Chest pain, mild calcified coronary atherosclerosis: The patient developed several episodes of severe chest pain, last one 2 weeks ago leading to ER visit.  Her ECG was normal.  Troponin was normal.  Chest CTA was reported mild calcified coronary artery disease.  We discussed the evaluation and management.  She has a several risk factors for developing significant coronary artery disease.  After discussion, exercise stress echo is requested for further evaluation of myocardial ischemia, heart function and structure.  Benign essential hypertension: Her blood pressure is controlled with amlodipine 5 mg daily.  Dyslipidemia: Continue Zocor 20 mg at bedtime.  Asthma: Stable    Thank you for the opportunity to be involved in the care of Janet Brar. If you have any questions, please feel free to contact me.  I will see the patient again in 3 months and as needed    Much or all of the text in this note was generated through the use of Dragon Dictate voice-to-text software. Errors in spelling or words which seem out of context are unintentional.   Sound alike errors, in particular, may have escaped editing.       History of Present Illness:   It is my pleasure to see Janet Brar at the [facility] clinic for evaluation of Urgent RAC. Janet Brar is a 73 year old female with a medical history of benign essential hypertension, dyslipidemia, asthma.    She went to emergency room for severe chest pain.  She states that she had 2 episodes of severe chest pain last year.  She had 1 severe episode of chest pain 2 weeks ago leading to ER visit.  She described her chest pain as located left anterior chest, sharp pain, severe, rated to left side, associated  "with shortness of breath.  She denies palpitations, dizziness, orthopnea, PND or leg edema.  Her blood pressure and heart rate are controlled.  Her ER evaluation was not impressive.  Chest CTA was reported and no pulmonary embolism, aortic dissection aneurysm, mild calcified coronary atherosclerosis.    Past Medical History:     Patient Active Problem List   Diagnosis    Asthma    HTN (hypertension)    Hyperlipidemia    Vitamin D deficiency       Past Surgical History:   No past surgical history on file.    Family History:   Reviewed: No family history of coronary artery disease.    Social History:    reports that she has never smoked. She has never used smokeless tobacco.    Review of Systems:   12 systems are reviewed negative except for in HPI.    Meds:     Current Outpatient Medications:     albuterol (PROAIR HFA/PROVENTIL HFA/VENTOLIN HFA) 108 (90 Base) MCG/ACT inhaler, INHALE ONE PUFF BY MOUTH EVERY 4 HOURS AS NEEDED, Disp: , Rfl:     albuterol (PROVENTIL) (2.5 MG/3ML) 0.083% neb solution, USE 1 VIAL VIA NEBULIZER EVERY 4 HOURS AS NEEDED FOR ASTHMA EXACERBATIONS, Disp: , Rfl:     amLODIPine (NORVASC) 5 MG tablet, Take 1 tablet by mouth daily at 2 pm, Disp: , Rfl:     cetirizine (ZYRTEC) 10 MG tablet, Take 1 tablet by mouth daily at 2 pm, Disp: , Rfl:     fluticasone (FLONASE) 50 MCG/ACT nasal spray, as needed, Disp: , Rfl:     fluticasone-salmeterol (ADVAIR) 500-50 MCG/ACT inhaler, Inhale 1 puff into the lungs 2 times daily, Disp: , Rfl:     predniSONE (DELTASONE) 20 MG tablet, Take 1 tablet by mouth 2 times daily, Disp: , Rfl:     simvastatin (ZOCOR) 20 MG tablet, Take 20 mg by mouth every morning, Disp: , Rfl:      Allergies:   Patient has no known allergies.    Objective:      Physical Exam  75.3 kg (166 lb)  1.676 m (5' 6\")  Body mass index is 26.79 kg/m .  /60 (BP Location: Left arm, Patient Position: Sitting, Cuff Size: Adult Regular)   Pulse 86   Ht 1.676 m (5' 6\")   Wt 75.3 kg (166 lb)   BMI " 26.79 kg/m      General Appearance:   Awake, Alert, No acute distress.   HEENT:  Pupil equal, reactive to light. No scleral icterus; the mucous membranes were moist. No oral ulcers or thrush.    Neck: No cervical bruits. No JVD. No thyromegaly. No lymph node enlargement or tenderness.   Chest: The spine was straight. The chest was symmetric.   Lungs:   Respirations unlabored. Lungs are clear to auscultation. No crackles. No wheezing.   Cardiovascular:   RRR, normal first and second heart sounds with no murmurs. No rubs or gallops.    Abdomen:  Soft. No tenderness. Non-distended. Bowels sounds are present   Extremities: Equal posterior tibial pulses. No leg edema.   Skin: No rashes or ulcers. Warm, Dry.   Musculoskeletal: No tenderness. No deformity.   Neurologic: Mood and affect are appropriate. No focal deficits.         EKG:  Personally reivewed  Normal sinus rhythm  Normal ECG  When compared to previous ECG  No significant change    Lab Review   Lab Results   Component Value Date     07/11/2024    CO2 24 07/11/2024    BUN 13.1 07/11/2024     Lab Results   Component Value Date    WBC 4.5 07/11/2024    HGB 11.6 07/11/2024    HCT 37.4 07/11/2024    MCV 92 07/11/2024     07/11/2024     Lab Results   Component Value Date    TSH 1.04 07/11/2024

## 2024-07-26 NOTE — LETTER
7/26/2024    Physician No Ref-Primary  No address on file    RE: Janet Brar       Dear Colleague,     I had the pleasure of seeing Janet Brar in the [facility].      Click to link to [facility] HEART CARE NOTE    Thank you, Dr. Horta, for asking me to see Janet Brar in consultation at [facility] to evaluate chest pain.      Assessment/Plan:   Chest pain, mild calcified coronary atherosclerosis: The patient developed several episodes of severe chest pain, last one 2 weeks ago leading to ER visit.  Her ECG was normal.  Troponin was normal.  Chest CTA was reported mild calcified coronary artery disease.  We discussed the evaluation and management.  She has a several risk factors for developing significant coronary artery disease.  After discussion, exercise stress echo is requested for further evaluation of myocardial ischemia, heart function and structure.  Benign essential hypertension: Her blood pressure is controlled with amlodipine 5 mg daily.  Dyslipidemia: Continue Zocor 20 mg at bedtime.  Asthma: Stable    Thank you for the opportunity to be involved in the care of Janet Brar. If you have any questions, please feel free to contact me.  I will see the patient again in 3 months and as needed    Much or all of the text in this note was generated through the use of Dragon Dictate voice-to-text software. Errors in spelling or words which seem out of context are unintentional.   Sound alike errors, in particular, may have escaped editing.       History of Present Illness:   It is my pleasure to see Janet Brar at the [facility] for evaluation of Urgent RAC. Janet Brar is a 73 year old female with a medical history of benign essential hypertension, dyslipidemia, asthma.    She went to emergency room for severe chest pain.  She states that she had 2 episodes of severe chest pain last  year.  She had 1 severe episode of chest pain 2 weeks ago leading to ER visit.  She described her chest pain as located left anterior chest, sharp pain, severe, rated to left side, associated with shortness of breath.  She denies palpitations, dizziness, orthopnea, PND or leg edema.  Her blood pressure and heart rate are controlled.  Her ER evaluation was not impressive.  Chest CTA was reported and no pulmonary embolism, aortic dissection aneurysm, mild calcified coronary atherosclerosis.    Past Medical History:     Patient Active Problem List   Diagnosis    Asthma    HTN (hypertension)    Hyperlipidemia    Vitamin D deficiency       Past Surgical History:   No past surgical history on file.    Family History:   Reviewed: No family history of coronary artery disease.    Social History:    reports that she has never smoked. She has never used smokeless tobacco.    Review of Systems:   12 systems are reviewed negative except for in HPI.    Meds:     Current Outpatient Medications:     albuterol (PROAIR HFA/PROVENTIL HFA/VENTOLIN HFA) 108 (90 Base) MCG/ACT inhaler, INHALE ONE PUFF BY MOUTH EVERY 4 HOURS AS NEEDED, Disp: , Rfl:     albuterol (PROVENTIL) (2.5 MG/3ML) 0.083% neb solution, USE 1 VIAL VIA NEBULIZER EVERY 4 HOURS AS NEEDED FOR ASTHMA EXACERBATIONS, Disp: , Rfl:     amLODIPine (NORVASC) 5 MG tablet, Take 1 tablet by mouth daily at 2 pm, Disp: , Rfl:     cetirizine (ZYRTEC) 10 MG tablet, Take 1 tablet by mouth daily at 2 pm, Disp: , Rfl:     fluticasone (FLONASE) 50 MCG/ACT nasal spray, as needed, Disp: , Rfl:     fluticasone-salmeterol (ADVAIR) 500-50 MCG/ACT inhaler, Inhale 1 puff into the lungs 2 times daily, Disp: , Rfl:     predniSONE (DELTASONE) 20 MG tablet, Take 1 tablet by mouth 2 times daily, Disp: , Rfl:     simvastatin (ZOCOR) 20 MG tablet, Take 20 mg by mouth every morning, Disp: , Rfl:      Allergies:   Patient has no known allergies.    Objective:      Physical Exam  75.3 kg (166 lb)  1.676 m  "(5' 6\")  Body mass index is 26.79 kg/m .  /60 (BP Location: Left arm, Patient Position: Sitting, Cuff Size: Adult Regular)   Pulse 86   Ht 1.676 m (5' 6\")   Wt 75.3 kg (166 lb)   BMI 26.79 kg/m      General Appearance:   Awake, Alert, No acute distress.   HEENT:  Pupil equal, reactive to light. No scleral icterus; the mucous membranes were moist. No oral ulcers or thrush.    Neck: No cervical bruits. No JVD. No thyromegaly. No lymph node enlargement or tenderness.   Chest: The spine was straight. The chest was symmetric.   Lungs:   Respirations unlabored. Lungs are clear to auscultation. No crackles. No wheezing.   Cardiovascular:   RRR, normal first and second heart sounds with no murmurs. No rubs or gallops.    Abdomen:  Soft. No tenderness. Non-distended. Bowels sounds are present   Extremities: Equal posterior tibial pulses. No leg edema.   Skin: No rashes or ulcers. Warm, Dry.   Musculoskeletal: No tenderness. No deformity.   Neurologic: Mood and affect are appropriate. No focal deficits.         EKG:  Personally reivewed  Normal sinus rhythm  Normal ECG  When compared to previous ECG  No significant change    Lab Review   Lab Results   Component Value Date     07/11/2024    CO2 24 07/11/2024    BUN 13.1 07/11/2024     Lab Results   Component Value Date    WBC 4.5 07/11/2024    HGB 11.6 07/11/2024    HCT 37.4 07/11/2024    MCV 92 07/11/2024     07/11/2024     Lab Results   Component Value Date    TSH 1.04 07/11/2024                   Thank you for allowing me to participate in the care of your patient.      Sincerely,     Leslie Sung MD     [facility]  cc:   Irma Horta PA-C  [facility]  [address] [place] [address]      "

## 2024-07-31 ENCOUNTER — HOSPITAL ENCOUNTER (OUTPATIENT)
Dept: CARDIOLOGY | Facility: HOSPITAL | Age: 74
Discharge: HOME | End: 2024-07-31
Attending: INTERNAL MEDICINE | Admitting: INTERNAL MEDICINE
Payer: MEDICARE

## 2024-07-31 DIAGNOSIS — I25.9 CHEST PAIN DUE TO MYOCARDIAL ISCHEMIA, UNSPECIFIED ISCHEMIC CHEST PAIN TYPE: ICD-10-CM

## 2024-07-31 LAB
CV STRESS CURRENT BP HE: NORMAL
CV STRESS CURRENT HR HE: 118
CV STRESS CURRENT HR HE: 131
CV STRESS CURRENT HR HE: 131
CV STRESS CURRENT HR HE: 132
CV STRESS CURRENT HR HE: 132
CV STRESS CURRENT HR HE: 69
CV STRESS CURRENT HR HE: 69
CV STRESS CURRENT HR HE: 71
CV STRESS CURRENT HR HE: 72
CV STRESS CURRENT HR HE: 74
CV STRESS CURRENT HR HE: 74
CV STRESS CURRENT HR HE: 75
CV STRESS CURRENT HR HE: 80
CV STRESS CURRENT HR HE: 80
CV STRESS CURRENT HR HE: 93
CV STRESS DEVIATION TIME HE: NORMAL
CV STRESS ECHO PERCENT HR HE: NORMAL
CV STRESS EXERCISE STAGE HE: NORMAL
CV STRESS EXERCISE STAGE REACHED HE: NORMAL
CV STRESS FINAL RESTING BP HE: NORMAL
CV STRESS FINAL RESTING HR HE: 74
CV STRESS MAX HR HE: 134
CV STRESS MAX TREADMILL GRADE HE: 12
CV STRESS MAX TREADMILL SPEED HE: 2.5
CV STRESS PEAK DIA BP HE: NORMAL
CV STRESS PEAK SYS BP HE: NORMAL
CV STRESS PHASE HE: NORMAL
CV STRESS PROTOCOL HE: NORMAL
CV STRESS REASON STOPPED HE: NORMAL
CV STRESS RESTING PT POSITION HE: NORMAL
CV STRESS RESTING PT POSITION HE: NORMAL
CV STRESS ST DEVIATION AMOUNT HE: NORMAL
CV STRESS ST DEVIATION ELEVATION HE: NORMAL
CV STRESS ST EVELATION AMOUNT HE: NORMAL
CV STRESS SYMPTOMS HE: NORMAL
CV STRESS TEST TYPE HE: NORMAL
CV STRESS TOTAL STAGE TIME MIN 1 HE: NORMAL
STRESS ECHO BASELINE DIASTOLIC HE: 73
STRESS ECHO BASELINE HR: 75
STRESS ECHO BASELINE SYSTOLIC BP: 146
STRESS ECHO LAST STRESS DIASTOLIC BP: 70
STRESS ECHO LAST STRESS HR: 131
STRESS ECHO LAST STRESS SYSTOLIC BP: 170
STRESS ECHO POST ESTIMATED WORKLOAD: 4.7
STRESS ECHO POST EXERCISE DUR MIN: 3
STRESS ECHO POST EXERCISE DUR SEC: 0
STRESS ECHO TARGET HR: 127

## 2024-07-31 PROCEDURE — 93018 CV STRESS TEST I&R ONLY: CPT | Performed by: INTERNAL MEDICINE

## 2024-07-31 PROCEDURE — 93350 STRESS TTE ONLY: CPT | Mod: 26 | Performed by: INTERNAL MEDICINE

## 2024-07-31 PROCEDURE — 93016 CV STRESS TEST SUPVJ ONLY: CPT | Performed by: STUDENT IN AN ORGANIZED HEALTH CARE EDUCATION/TRAINING PROGRAM

## 2024-07-31 PROCEDURE — 93325 DOPPLER ECHO COLOR FLOW MAPG: CPT | Mod: TC

## 2024-07-31 PROCEDURE — 93325 DOPPLER ECHO COLOR FLOW MAPG: CPT | Mod: 26 | Performed by: INTERNAL MEDICINE

## 2024-07-31 PROCEDURE — 93321 DOPPLER ECHO F-UP/LMTD STD: CPT | Mod: 26 | Performed by: INTERNAL MEDICINE
